# Patient Record
Sex: FEMALE | Race: WHITE | Employment: FULL TIME | URBAN - METROPOLITAN AREA
[De-identification: names, ages, dates, MRNs, and addresses within clinical notes are randomized per-mention and may not be internally consistent; named-entity substitution may affect disease eponyms.]

---

## 2019-07-03 ENCOUNTER — OFFICE VISIT (OUTPATIENT)
Dept: FAMILY MEDICINE CLINIC | Facility: CLINIC | Age: 54
End: 2019-07-03
Payer: COMMERCIAL

## 2019-07-03 VITALS — TEMPERATURE: 98 F | HEART RATE: 80 BPM | RESPIRATION RATE: 20 BRPM | OXYGEN SATURATION: 97 %

## 2019-07-03 DIAGNOSIS — B30.9 ACUTE VIRAL CONJUNCTIVITIS OF LEFT EYE: Primary | ICD-10-CM

## 2019-07-03 PROCEDURE — 99202 OFFICE O/P NEW SF 15 MIN: CPT

## 2019-07-03 NOTE — PROGRESS NOTES
Bronson Reed is a 46year old female. CHIEF COMPLAINT:   Patient presents with:  Eye Problem: left, since yessenia of 7/1/19        HPI:   Bronson Reed is a 46year old female who presents with chief complaint of \"pink eye\".  Symptoms began  2  days a Eye Problem: left, since yessenia of 7/1/19      ASSESSMENT:  Acute viral conjunctivitis of left eye  (primary encounter diagnosis)    PLAN:  Requested Prescriptions      No prescriptions requested or ordered in this encounter       Instructions discussed with · If antibiotic medicines are prescribed, take them exactly as directed. Don't stop taking them until you are told to. · You may use acetaminophen or ibuprofen to control pain, unless another medicine was prescribed.  (Note: If you have chronic liver or ki

## 2019-12-26 ENCOUNTER — APPOINTMENT (OUTPATIENT)
Dept: GENERAL RADIOLOGY | Facility: HOSPITAL | Age: 54
End: 2019-12-26
Payer: COMMERCIAL

## 2019-12-26 ENCOUNTER — APPOINTMENT (OUTPATIENT)
Dept: CT IMAGING | Facility: HOSPITAL | Age: 54
End: 2019-12-26
Attending: EMERGENCY MEDICINE
Payer: COMMERCIAL

## 2019-12-26 ENCOUNTER — HOSPITAL ENCOUNTER (EMERGENCY)
Facility: HOSPITAL | Age: 54
Discharge: HOME | End: 2019-12-26
Payer: COMMERCIAL

## 2019-12-26 VITALS
RESPIRATION RATE: 20 BRPM | WEIGHT: 117 LBS | SYSTOLIC BLOOD PRESSURE: 123 MMHG | HEIGHT: 62 IN | HEART RATE: 86 BPM | TEMPERATURE: 98 F | DIASTOLIC BLOOD PRESSURE: 90 MMHG | BODY MASS INDEX: 21.53 KG/M2 | OXYGEN SATURATION: 99 %

## 2019-12-26 DIAGNOSIS — E05.90 HYPERTHYROIDISM: ICD-10-CM

## 2019-12-26 DIAGNOSIS — R00.2 PALPITATIONS: Primary | ICD-10-CM

## 2019-12-26 PROCEDURE — 99285 EMERGENCY DEPT VISIT HI MDM: CPT

## 2019-12-26 PROCEDURE — 85025 COMPLETE CBC W/AUTO DIFF WBC: CPT

## 2019-12-26 PROCEDURE — 85379 FIBRIN DEGRADATION QUANT: CPT | Performed by: EMERGENCY MEDICINE

## 2019-12-26 PROCEDURE — 71260 CT THORAX DX C+: CPT | Performed by: EMERGENCY MEDICINE

## 2019-12-26 PROCEDURE — 93010 ELECTROCARDIOGRAM REPORT: CPT | Performed by: INTERNAL MEDICINE

## 2019-12-26 PROCEDURE — 71046 X-RAY EXAM CHEST 2 VIEWS: CPT

## 2019-12-26 PROCEDURE — 84439 ASSAY OF FREE THYROXINE: CPT

## 2019-12-26 PROCEDURE — 84484 ASSAY OF TROPONIN QUANT: CPT

## 2019-12-26 PROCEDURE — 80048 BASIC METABOLIC PNL TOTAL CA: CPT

## 2019-12-26 PROCEDURE — 93005 ELECTROCARDIOGRAM TRACING: CPT

## 2019-12-26 PROCEDURE — 83735 ASSAY OF MAGNESIUM: CPT | Performed by: EMERGENCY MEDICINE

## 2019-12-26 PROCEDURE — 96360 HYDRATION IV INFUSION INIT: CPT

## 2019-12-26 PROCEDURE — 84443 ASSAY THYROID STIM HORMONE: CPT

## 2019-12-26 NOTE — ED NOTES
Received from triage, reports waking up yesterday with pressure on chest, fever, denies shortness of breath no sweating, pt currently taking antibiotic for uri today last dose, afebrile, connected to a cardiac monitor, awaiting for labs, call light within

## 2019-12-26 NOTE — ED PROVIDER NOTES
Patient Seen in: [facility]formerly [facility] AND [facility] Emergency Department      History   Patient presents with:  Arrythmia/Palpitations    Stated Complaint:     HPI    78-year-old female presents for complaint of elevated heart rate.   Patient states her heart rate started 97.8 °F (36.6 °C) (Temporal)   Resp 20   Ht 157.5 cm (5' 2\")   Wt 53.1 kg   SpO2 99%   BMI 21.40 kg/m²         Physical Exam  Vitals signs and nursing note reviewed. Constitutional:       General: She is not in acute distress.      Appearance: Normal kyree normal.         Mood and Affect: Mood normal.         Speech: Speech normal.         Behavior: Behavior normal.               ED Course     Labs Reviewed   TSH W REFLEX TO FREE T4 - Abnormal; Notable for the following components:       Result Value    TSH cardiac silhouette abnormality or cardiomegaly. Unremarkable pulmonary vasculature. MEDIAST/MARIANNE: No visible mass or adenopathy. LUNGS/PLEURA: Normal.  No significant pulmonary parenchymal abnormalities. No effusion or pleural thickening.   BONES: No fra spine fusion has been performed beginning at T8 and extending distally beyond the field of imaging. Mild degenerative endplate change above the level of fusion. CONCLUSION:  1. No acute pulmonary embolus to the segmental pulmonary artery level.

## 2019-12-26 NOTE — ED INITIAL ASSESSMENT (HPI)
Pt having palpitations since yesterday. States her HR was 170-200 and lasted for 2 hours. Felt dizzy with the HR, no sweating. Still having palpitations since then. Has one antibiotic pill left for strep throat diagnosed 10 days ago.

## (undated) NOTE — ED AVS SNAPSHOT
Dennis Walters   MRN: [id number]    Department:  [facility] Emergency Department   Date of Visit:  12/26/2019           Disclosure     Insurance plans vary and the physician(s) referred by the ER may not be covered by your plan.  Please contac CARE PHYSICIAN AT ONCE OR RETURN IMMEDIATELY TO THE EMERGENCY DEPARTMENT. If you have been prescribed any medication(s), please fill your prescription right away and begin taking the medication(s) as directed.   If you believe that any of the medications